# Patient Record
Sex: FEMALE | Race: WHITE | NOT HISPANIC OR LATINO | Employment: FULL TIME | URBAN - METROPOLITAN AREA
[De-identification: names, ages, dates, MRNs, and addresses within clinical notes are randomized per-mention and may not be internally consistent; named-entity substitution may affect disease eponyms.]

---

## 2022-08-20 ENCOUNTER — OFFICE VISIT (OUTPATIENT)
Dept: URGENT CARE | Facility: CLINIC | Age: 42
End: 2022-08-20
Payer: COMMERCIAL

## 2022-08-20 VITALS
SYSTOLIC BLOOD PRESSURE: 122 MMHG | TEMPERATURE: 97.9 F | DIASTOLIC BLOOD PRESSURE: 79 MMHG | WEIGHT: 219 LBS | BODY MASS INDEX: 35.2 KG/M2 | HEART RATE: 98 BPM | OXYGEN SATURATION: 98 % | HEIGHT: 66 IN

## 2022-08-20 DIAGNOSIS — L03.317 CELLULITIS, GLUTEAL: Primary | ICD-10-CM

## 2022-08-20 PROCEDURE — G0382 LEV 3 HOSP TYPE B ED VISIT: HCPCS | Performed by: PHYSICIAN ASSISTANT

## 2022-08-20 RX ORDER — INSULIN LISPRO 100 [IU]/ML
30 INJECTION, SOLUTION INTRAVENOUS; SUBCUTANEOUS
COMMUNITY

## 2022-08-20 RX ORDER — INSULIN DEGLUDEC INJECTION 100 U/ML
60 INJECTION, SOLUTION SUBCUTANEOUS
COMMUNITY

## 2022-08-20 RX ORDER — NORGESTIMATE AND ETHINYL ESTRADIOL 0.25-0.035
1 KIT ORAL DAILY
COMMUNITY

## 2022-08-20 RX ORDER — LISINOPRIL 40 MG/1
40 TABLET ORAL DAILY
COMMUNITY

## 2022-08-20 RX ORDER — SUMATRIPTAN 50 MG/1
50 TABLET, FILM COATED ORAL ONCE AS NEEDED
COMMUNITY

## 2022-08-20 RX ORDER — TIRZEPATIDE 2.5 MG/.5ML
INJECTION, SOLUTION SUBCUTANEOUS WEEKLY
COMMUNITY

## 2022-08-20 RX ORDER — CEPHALEXIN 500 MG/1
500 CAPSULE ORAL EVERY 12 HOURS SCHEDULED
Qty: 20 CAPSULE | Refills: 0 | Status: SHIPPED | OUTPATIENT
Start: 2022-08-20 | End: 2022-08-30

## 2022-08-20 RX ORDER — OMEPRAZOLE 20 MG/1
20 CAPSULE, DELAYED RELEASE ORAL DAILY
COMMUNITY

## 2022-08-20 RX ORDER — ATORVASTATIN CALCIUM 20 MG/1
20 TABLET, FILM COATED ORAL DAILY
COMMUNITY

## 2022-08-20 RX ORDER — FAMOTIDINE 40 MG/1
40 TABLET, FILM COATED ORAL DAILY
COMMUNITY

## 2022-08-20 NOTE — PROGRESS NOTES
History I for on care no no oral  [facility]        NAME: Mansi Becerra is a 46 y o  female  : 1980    MRN: [id number]  DATE: 2022  TIME: 9:00 AM    Assessment and Plan   Cellulitis, gluteal [T28 [id number]]  1  Cellulitis, gluteal  cephalexin (KEFLEX) 500 mg capsule         Patient Instructions   Take antibiotic as prescribed  Complete full dose of antibiotics even if symptoms begin to improve or resolve  May use OTC Tylenol for fever  Observe for signs of worsening infection including increased swelling, redness, pain, discharge, fever or chills, or persistent symptoms    Follow up with PCP in 3-5 days  Proceed to  ER if symptoms worsen  Chief Complaint     Chief Complaint   Patient presents with    Back Pain     Pt c/o ongoing cysts on lower back, increasing pain, concerned either one has burst or a new one has formed? Pain 3/10         History of Present Illness       Patient is a 30-year-old female with significant past medical history of type 1 diabetes presents the office complaining of pain to her lower back for few days  Patient has history of pilonidal cyst in this area but states they have not bothered her for many years  States she was using her loofa and actually scratched her lower back with him fingernail  Reports area has become more painful than usual but denies fevers, chills, or drainage  Review of Systems   Review of Systems   Constitutional: Negative for chills and fever  Gastrointestinal: Negative for nausea and vomiting           Current Medications       Current Outpatient Medications:     atorvastatin (LIPITOR) 20 mg tablet, Take 20 mg by mouth daily, Disp: , Rfl:     cephalexin (KEFLEX) 500 mg capsule, Take 1 capsule (500 mg total) by mouth every 12 (twelve) hours for 10 days, Disp: 20 capsule, Rfl: 0    famotidine (PEPCID) 40 MG tablet, Take 40 mg by mouth daily, Disp: , Rfl:     insulin degludec Tahsia Melchor FlexTouch) 100 units/mL injection pen, Inject 60 Units under the skin, Disp: , Rfl:     insulin lispro (HumaLOG) 100 units/mL injection, Inject 30 Units under the skin 3 (three) times a day with meals, Disp: , Rfl:     lisinopril (ZESTRIL) 40 mg tablet, Take 40 mg by mouth daily, Disp: , Rfl:     metFORMIN (GLUCOPHAGE) 1000 MG tablet, Take 1,000 mg by mouth 2 (two) times a day with meals, Disp: , Rfl:     norgestimate-ethinyl estradiol (ORTHO-CYCLEN) 0 25-35 MG-MCG per tablet, Take 1 tablet by mouth daily, Disp: , Rfl:     omeprazole (PriLOSEC) 20 mg delayed release capsule, Take 20 mg by mouth daily, Disp: , Rfl:     SUMAtriptan (IMITREX) 50 mg tablet, Take 50 mg by mouth once as needed for migraine, Disp: , Rfl:     Tirzepatide (Mounjaro) 2 5 MG/0 5ML SOPN, Inject under the skin, Disp: , Rfl:     Current Allergies     Allergies as of 08/20/2022 - Reviewed 08/20/2022   Allergen Reaction Noted    Azithromycin Hives 08/20/2022    Bactrim [sulfamethoxazole-trimethoprim] Hives 08/20/2022    Doxycycline Swelling 08/20/2022            The following portions of the patient's history were reviewed and updated as appropriate: allergies, current medications, past family history, past medical history, past social history, past surgical history and problem list      Past Medical History:   Diagnosis Date    Diabetes mellitus ([address] )     GERD (gastroesophageal reflux disease)        Past Surgical History:   Procedure Laterality Date    THROAT SURGERY  2020    TUBAL LIGATION  2016       History reviewed  No pertinent family history  Medications have been verified  Objective   /79   Pulse 98   Temp 97 9 °F (36 6 °C)   Ht 5' 6" (1 676 m)   Wt 99 3 kg (219 lb)   SpO2 98%   BMI 35 35 kg/m²   No LMP recorded  (Menstrual status: Birth Control)  Physical Exam     Physical Exam  Vitals and nursing note reviewed  Constitutional:       Appearance: She is well-developed  HENT:      Head: Normocephalic and atraumatic        Right Ear: External ear normal       Left Ear: External ear normal       Nose: Nose normal    Eyes:      General: Lids are normal       Conjunctiva/sclera: Conjunctivae normal    Cardiovascular:      Rate and Rhythm: Normal rate and regular rhythm  Pulmonary:      Effort: Pulmonary effort is normal       Breath sounds: Normal breath sounds  Skin:     General: Skin is warm and dry  Capillary Refill: Capillary refill takes less than 2 seconds  Neurological:      Mental Status: She is alert

## 2022-08-23 ENCOUNTER — OFFICE VISIT (OUTPATIENT)
Dept: URGENT CARE | Facility: CLINIC | Age: 42
End: 2022-08-23
Payer: COMMERCIAL

## 2022-08-23 VITALS
WEIGHT: 219 LBS | SYSTOLIC BLOOD PRESSURE: 132 MMHG | HEIGHT: 66 IN | DIASTOLIC BLOOD PRESSURE: 80 MMHG | RESPIRATION RATE: 16 BRPM | TEMPERATURE: 98.9 F | BODY MASS INDEX: 35.2 KG/M2 | HEART RATE: 102 BPM | OXYGEN SATURATION: 100 %

## 2022-08-23 DIAGNOSIS — L03.317 CELLULITIS OF BUTTOCK: Primary | ICD-10-CM

## 2022-08-23 PROCEDURE — G0382 LEV 3 HOSP TYPE B ED VISIT: HCPCS | Performed by: PHYSICIAN ASSISTANT

## 2022-08-23 RX ORDER — CLINDAMYCIN HYDROCHLORIDE 300 MG/1
300 CAPSULE ORAL 3 TIMES DAILY
Qty: 30 CAPSULE | Refills: 0 | Status: SHIPPED | OUTPATIENT
Start: 2022-08-23 | End: 2022-09-02

## 2022-08-23 NOTE — PROGRESS NOTES
[facility]        NAME: Saint Brewer is a 46 y o  female  : 1980    MRN: [id number]  DATE: 2022  TIME: 6:20 PM    Assessment and Plan   Cellulitis of buttock [L03 [id number]]  1  Cellulitis of buttock  clindamycin (CLEOCIN) 300 MG capsule         Patient Instructions   Discontinue Keflex  Begin clindamycin antibiotic  Sitz baths  Tylenol and ibuprofen    Follow up with PCP in 3-5 days  Proceed to  ER if symptoms worsen  Chief Complaint     Chief Complaint   Patient presents with    Pilonidal Cyst     Hx of pilonidal cyst 10 yrs ago then went dormant  Now it seems to have gotten filled again         History of Present Illness          Patient is a 28-year-old female with significant past medical history of type 1 diabetes presents the office complaining of pain to her lower back 1 week  Patient has history of pilonidal cyst in this area but states they have not bothered her for many years  States she was using her loofa and actually scratched her lower back with him fingernail  She was seen in the office 3 days ago and prescribed Keflex for cellulitis  States she has been taking as prescribed but the area has become more red and painful  Was unable to see her PCP due to lack of appointments  Denies fevers, chills, nausea, or vomiting      Review of Systems   Review of Systems   Constitutional: Negative for chills and fever  Skin: Positive for wound           Current Medications       Current Outpatient Medications:     atorvastatin (LIPITOR) 20 mg tablet, Take 20 mg by mouth daily, Disp: , Rfl:     cephalexin (KEFLEX) 500 mg capsule, Take 1 capsule (500 mg total) by mouth every 12 (twelve) hours for 10 days, Disp: 20 capsule, Rfl: 0    clindamycin (CLEOCIN) 300 MG capsule, Take 1 capsule (300 mg total) by mouth 3 (three) times a day for 10 days, Disp: 30 capsule, Rfl: 0    famotidine (PEPCID) 40 MG tablet, Take 40 mg by mouth daily, Disp: , Rfl:     insulin degludec Konrad Gitelman FlexTouch) 100 units/mL injection pen, Inject 60 Units under the skin, Disp: , Rfl:     insulin lispro (HumaLOG) 100 units/mL injection, Inject 30 Units under the skin 3 (three) times a day with meals, Disp: , Rfl:     lisinopril (ZESTRIL) 40 mg tablet, Take 40 mg by mouth daily, Disp: , Rfl:     metFORMIN (GLUCOPHAGE) 1000 MG tablet, Take 1,000 mg by mouth 2 (two) times a day with meals, Disp: , Rfl:     norgestimate-ethinyl estradiol (ORTHO-CYCLEN) 0 25-35 MG-MCG per tablet, Take 1 tablet by mouth daily, Disp: , Rfl:     omeprazole (PriLOSEC) 20 mg delayed release capsule, Take 20 mg by mouth daily, Disp: , Rfl:     SUMAtriptan (IMITREX) 50 mg tablet, Take 50 mg by mouth once as needed for migraine, Disp: , Rfl:     Tirzepatide (Mounjaro) 2 5 MG/0 5ML SOPN, Inject under the skin once a week, Disp: , Rfl:     Current Allergies     Allergies as of 08/23/2022 - Reviewed 08/23/2022   Allergen Reaction Noted    Azithromycin Hives 08/20/2022    Bactrim [sulfamethoxazole-trimethoprim] Hives 08/20/2022    Doxycycline Swelling 08/20/2022            The following portions of the patient's history were reviewed and updated as appropriate: allergies, current medications, past family history, past medical history, past social history, past surgical history and problem list      Past Medical History:   Diagnosis Date    Diabetes mellitus [address] [address] )     GERD (gastroesophageal reflux disease)        Past Surgical History:   Procedure Laterality Date    THROAT SURGERY  2020    TUBAL LIGATION  2016       Family History   Problem Relation Age of Onset    Diabetes Mother          Medications have been verified  Objective   /80   Pulse 102   Temp 98 9 °F (37 2 °C)   Resp 16   Ht 5' 6" (1 676 m)   Wt 99 3 kg (219 lb)   SpO2 100%   BMI 35 35 kg/m²   No LMP recorded  (Menstrual status: Birth Control)  Physical Exam     Physical Exam  Vitals and nursing note reviewed     Constitutional: Appearance: She is well-developed  HENT:      Head: Normocephalic and atraumatic  Right Ear: External ear normal       Left Ear: External ear normal       Nose: Nose normal    Eyes:      General: Lids are normal       Conjunctiva/sclera: Conjunctivae normal    Skin:     General: Skin is warm and dry  Capillary Refill: Capillary refill takes less than 2 seconds  Comments: Approximately 4 cm area of erythema, swelling, warmth, and hard induration to the gluteal cleft  No fluctuation or palpable drainable abscess  TTP  Neurological:      Mental Status: She is alert

## 2022-08-23 NOTE — PATIENT INSTRUCTIONS
Discontinue Keflex  Begin clindamycin antibiotic  Sitz baths  Tylenol and ibuprofen  Follow-up with family doctor in 3-5 days  Go to ER if symptoms become severe

## 2024-07-21 ENCOUNTER — OFFICE VISIT (OUTPATIENT)
Dept: URGENT CARE | Facility: CLINIC | Age: 44
End: 2024-07-21
Payer: COMMERCIAL

## 2024-07-21 ENCOUNTER — APPOINTMENT (OUTPATIENT)
Dept: RADIOLOGY | Facility: CLINIC | Age: 44
End: 2024-07-21
Payer: COMMERCIAL

## 2024-07-21 VITALS
SYSTOLIC BLOOD PRESSURE: 161 MMHG | WEIGHT: 200 LBS | TEMPERATURE: 98.3 F | DIASTOLIC BLOOD PRESSURE: 91 MMHG | BODY MASS INDEX: 32.14 KG/M2 | HEIGHT: 66 IN | RESPIRATION RATE: 18 BRPM | HEART RATE: 118 BPM | OXYGEN SATURATION: 100 %

## 2024-07-21 DIAGNOSIS — S92.901A CLOSED FRACTURE OF RIGHT FOOT, INITIAL ENCOUNTER: Primary | ICD-10-CM

## 2024-07-21 DIAGNOSIS — M79.671 RIGHT FOOT PAIN: ICD-10-CM

## 2024-07-21 PROCEDURE — 73630 X-RAY EXAM OF FOOT: CPT

## 2024-07-21 PROCEDURE — G0382 LEV 3 HOSP TYPE B ED VISIT: HCPCS | Performed by: PHYSICIAN ASSISTANT

## 2024-07-21 NOTE — PROGRESS NOTES
[facility]        NAME: Mansi Palm is a 43 y.o. female  : 1980    MRN: [id number]  DATE: 2024  TIME: 8:07 AM    Assessment and Plan   Closed fracture of right foot, initial encounter [S92.901A]  1. Closed fracture of right foot, initial encounter  XR foot 3+ vw right    Ambulatory Referral to Orthopedic Surgery    Orthopedic injury treatment            Patient Instructions   Cam boots.  Elevate.  Tylenol ibuprofen.  Orthopedics.    Follow up with PCP in 3-5 days.  Proceed to  ER if symptoms worsen.    If tests have been performed at [facility], our office will contact you with results if changes need to be made to the care plan discussed with you at the visit.  You can review your full results on [facility].    Chief Complaint     Chief Complaint   Patient presents with    Foot Pain     Right foot pain from table falling on on           History of Present Illness       Patient is a 43-year-old female significant past medical history of hypertension and diabetes presents also planing of right foot pain for 4 weeks.  States she dropped a heavy table on it.  Thought it was just bruised so she has been taking care of it at home.  Pain is rated 6 out of 10.  Symptoms were improving but then got worse after she was doing a bunch of gardening.  She has an area of skin discoloration and ecchymosis that began same day or day after injury which has not improved or changed at all.        Review of Systems   Review of Systems   Musculoskeletal:  Positive for arthralgias.   Skin:  Positive for color change.         Current Medications       Current Outpatient Medications:     atorvastatin (LIPITOR) 20 mg tablet, Take 20 mg by mouth daily, Disp: , Rfl:     famotidine (PEPCID) 40 MG tablet, Take 40 mg by mouth daily, Disp: , Rfl:     insulin degludec (Tresiba FlexTouch) 100 units/mL injection pen, Inject 60 Units under the skin, Disp: , Rfl:     insulin lispro (HumaLOG) 100 units/mL  "injection, Inject 30 Units under the skin 3 (three) times a day with meals, Disp: , Rfl:     lisinopril (ZESTRIL) 40 mg tablet, Take 40 mg by mouth daily, Disp: , Rfl:     metFORMIN (GLUCOPHAGE) 1000 MG tablet, Take 1,000 mg by mouth 2 (two) times a day with meals, Disp: , Rfl:     norgestimate-ethinyl estradiol (ORTHO-CYCLEN) 0.25-35 MG-MCG per tablet, Take 1 tablet by mouth daily, Disp: , Rfl:     omeprazole (PriLOSEC) 20 mg delayed release capsule, Take 20 mg by mouth daily, Disp: , Rfl:     SUMAtriptan (IMITREX) 50 mg tablet, Take 50 mg by mouth once as needed for migraine, Disp: , Rfl:     Tirzepatide (Mounjaro) 2.5 MG/0.5ML SOPN, Inject under the skin once a week, Disp: , Rfl:     Current Allergies     Allergies as of 07/21/2024 - Reviewed 07/21/2024   Allergen Reaction Noted    Azithromycin Hives 08/20/2022    Bactrim [sulfamethoxazole-trimethoprim] Hives 08/20/2022    Doxycycline Swelling 08/20/2022            The following portions of the patient's history were reviewed and updated as appropriate: allergies, current medications, past family history, past medical history, past social history, past surgical history and problem list.     Past Medical History:   Diagnosis Date    Diabetes mellitus (HCC)     GERD (gastroesophageal reflux disease)     Hypertension        Past Surgical History:   Procedure Laterality Date    THROAT SURGERY  2020    TUBAL LIGATION  2016       Family History   Problem Relation Age of Onset    Diabetes Mother          Medications have been verified.        Objective   /91   Pulse (!) 118   Temp 98.3 °F (36.8 °C) (Temporal)   Resp 18   Ht 5' 6\" (1.676 m)   Wt 90.7 kg (200 lb)   SpO2 100%   BMI 32.28 kg/m²   No LMP recorded.       Physical Exam     Physical Exam  Vitals and nursing note reviewed.   Constitutional:       Appearance: She is well-developed.   HENT:      Head: Normocephalic and atraumatic.      Right Ear: External ear normal.      Left Ear: External ear " normal.      Nose: Nose normal.   Eyes:      General: Lids are normal.      Conjunctiva/sclera: Conjunctivae normal.   Musculoskeletal:      Right foot: Normal range of motion. Bony tenderness (distal 1MT with skin redness. see photo) present.   Skin:     General: Skin is warm and dry.      Capillary Refill: Capillary refill takes less than 2 seconds.   Neurological:      Mental Status: She is alert.         Right foot x-ray: Healing fracture to distal aspect of first metatarsal.              Right foot          Orthopedic injury treatment    Date/Time: 7/21/2024 10:00 AM    Performed by: Daniel Humphrey PA-C  Authorized by: Daniel Humphrey PA-C    Patient Location:  Bedside  [place] Protocol:  Consent: Verbal consent obtained.  Risks and benefits: risks, benefits and alternatives were discussed  Consent given by: patient  Patient understanding: patient states understanding of the procedure being performed  Patient consent: the patient's understanding of the procedure matches consent given  Patient identity confirmed: verbally with patient    Injury location:  Foot  Location details:  Right foot  Injury type:  Fracture  Fracture type: first metatarsal    Neurovascular status: Neurovascularly intact    Distal perfusion: normal    Neurological function: normal    Range of motion: normal    Immobilization: [place].  Neurovascular status: Neurovascularly intact    Distal perfusion: normal    Neurological function: normal    Range of motion: unchanged    Patient tolerance:  Patient tolerated the procedure well with no immediate complications

## 2025-02-26 ENCOUNTER — OFFICE VISIT (OUTPATIENT)
Dept: URGENT CARE | Facility: CLINIC | Age: 45
End: 2025-02-26
Payer: COMMERCIAL

## 2025-02-26 VITALS
HEART RATE: 119 BPM | DIASTOLIC BLOOD PRESSURE: 71 MMHG | SYSTOLIC BLOOD PRESSURE: 120 MMHG | WEIGHT: 209.8 LBS | RESPIRATION RATE: 20 BRPM | BODY MASS INDEX: 33.72 KG/M2 | TEMPERATURE: 97.6 F | HEIGHT: 66 IN | OXYGEN SATURATION: 97 %

## 2025-02-26 DIAGNOSIS — B34.9 VIRAL SYNDROME: Primary | ICD-10-CM

## 2025-02-26 PROCEDURE — G0382 LEV 3 HOSP TYPE B ED VISIT: HCPCS | Performed by: PHYSICIAN ASSISTANT

## 2025-02-26 PROCEDURE — 87636 SARSCOV2 & INF A&B AMP PRB: CPT | Performed by: PHYSICIAN ASSISTANT

## 2025-02-26 RX ORDER — ONDANSETRON 4 MG/1
TABLET, ORALLY DISINTEGRATING ORAL
COMMUNITY
Start: 2025-01-14

## 2025-02-26 RX ORDER — INSULIN ASPART 100 [IU]/ML
INJECTION, SOLUTION INTRAVENOUS; SUBCUTANEOUS
COMMUNITY
Start: 2025-01-31

## 2025-02-26 RX ORDER — METFORMIN HYDROCHLORIDE 500 MG/1
1000 TABLET, EXTENDED RELEASE ORAL 2 TIMES DAILY WITH MEALS
COMMUNITY
Start: 2024-12-29

## 2025-02-26 RX ORDER — LEVOCETIRIZINE DIHYDROCHLORIDE 5 MG/1
5 TABLET, FILM COATED ORAL
COMMUNITY
Start: 2024-10-24

## 2025-02-26 RX ORDER — BENZONATATE 100 MG/1
100 CAPSULE ORAL 3 TIMES DAILY PRN
Qty: 20 CAPSULE | Refills: 0 | Status: SHIPPED | OUTPATIENT
Start: 2025-02-26

## 2025-02-26 RX ORDER — INSULIN DEGLUDEC 200 U/ML
INJECTION, SOLUTION SUBCUTANEOUS
COMMUNITY
Start: 2025-01-08

## 2025-02-26 RX ORDER — GLUCAGON INJECTION, SOLUTION 1 MG/.2ML
1 INJECTION, SOLUTION SUBCUTANEOUS
COMMUNITY
Start: 2024-12-03

## 2025-02-26 RX ORDER — OSELTAMIVIR PHOSPHATE 75 MG/1
75 CAPSULE ORAL EVERY 12 HOURS SCHEDULED
Qty: 10 CAPSULE | Refills: 0 | Status: SHIPPED | OUTPATIENT
Start: 2025-02-26 | End: 2025-03-03

## 2025-02-26 RX ORDER — TIRZEPATIDE 7.5 MG/.5ML
INJECTION, SOLUTION SUBCUTANEOUS
COMMUNITY
Start: 2025-02-24

## 2025-02-26 NOTE — PROGRESS NOTES
"[facility]        NAME: Mansi Palm is a 44 y.o. female  : 1980    MRN: [id number]  DATE: 2025  TIME: 8:46 AM    /71   Pulse (!) 119   Temp 97.6 °F (36.4 °C)   Resp 20   Ht 5' 6\" (1.676 m)   Wt 95.2 kg (209 lb 12.8 oz)   LMP 2025   SpO2 97%   BMI 33.86 kg/m²     Assessment and Plan   Viral syndrome [B34.9]  1. Viral syndrome  benzonatate (TESSALON PERLES) 100 mg capsule    oseltamivir (TAMIFLU) 75 mg capsule            Patient Instructions       Follow up with PCP in 3-5 days.  Proceed to  ER if symptoms worsen.    Chief Complaint     Chief Complaint   Patient presents with    Cold Like Symptoms     Sinus congestion, coughing, sore throat, torso pain d/t coughing, and fevers, starting . Chills, runny nose, vomiting, ear pain, and headaches starting Monday. Ear pressure and dizziness starting yesterday.          History of Present Illness       Pt with 3 days coughing fever body aches nasal congestion, no flu vaccine this year, home covid test negative this morning         Review of Systems   Review of Systems   Constitutional:  Positive for fatigue and fever.   HENT:  Positive for congestion and rhinorrhea.    Eyes: Negative.    Respiratory:  Positive for cough.    Cardiovascular: Negative.    Gastrointestinal: Negative.    Endocrine: Negative.    Genitourinary: Negative.    Musculoskeletal:  Positive for myalgias.   Skin: Negative.    Allergic/Immunologic: Negative.    Neurological: Negative.    Hematological: Negative.    Psychiatric/Behavioral: Negative.     All other systems reviewed and are negative.        Current Medications       Current Outpatient Medications:     Acetaminophen (TYLENOL PO), Take by mouth, Disp: , Rfl:     atorvastatin (LIPITOR) 20 mg tablet, Take 20 mg by mouth daily, Disp: , Rfl:     benzonatate (TESSALON PERLES) 100 mg capsule, Take 1 capsule (100 mg total) by mouth 3 (three) times a day as needed for cough, Disp: 20 capsule, " Rfl: 0    famotidine (PEPCID) 40 MG tablet, Take 40 mg by mouth daily, Disp: , Rfl:     Gvoke HypoPen 2-Pack 1 MG/0.2ML SOAJ, Inject 1 mg under the skin, Disp: , Rfl:     Ibuprofen (MOTRIN PO), Take by mouth, Disp: , Rfl:     insulin lispro (HumaLOG) 100 units/mL injection, Inject 30 Units under the skin 3 (three) times a day with meals, Disp: , Rfl:     levocetirizine (XYZAL) 5 MG tablet, Take 5 mg by mouth, Disp: , Rfl:     lisinopril (ZESTRIL) 40 mg tablet, Take 40 mg by mouth daily, Disp: , Rfl:     metFORMIN (GLUCOPHAGE) 1000 MG tablet, Take 1,000 mg by mouth 2 (two) times a day with meals (Patient not taking: Reported on 2/26/2025), Disp: , Rfl:     metFORMIN (GLUCOPHAGE-XR) 500 mg 24 hr tablet, 1,000 mg 2 (two) times a day with meals, Disp: , Rfl:     Mounjaro 7.5 MG/0.5ML SOAJ, , Disp: , Rfl:     norgestimate-ethinyl estradiol (ORTHO-CYCLEN) 0.25-35 MG-MCG per tablet, Take 1 tablet by mouth daily, Disp: , Rfl:     NovoLOG FlexPen 100 units/mL injection pen, USE PER SLIDING SCALE. TOTAL DAILY DOSE 100 UNITS, Disp: , Rfl:     omeprazole (PriLOSEC) 20 mg delayed release capsule, Take 20 mg by mouth daily, Disp: , Rfl:     ondansetron (ZOFRAN-ODT) 4 mg disintegrating tablet, DISSOLVE 1 TABLET (4 MG TOTAL) IN CHEEK EVERY 8 (EIGHT) HOURS AS NEEDED FOR NAUSEA OR VOMITING., Disp: , Rfl:     oseltamivir (TAMIFLU) 75 mg capsule, Take 1 capsule (75 mg total) by mouth every 12 (twelve) hours for 5 days, Disp: 10 capsule, Rfl: 0    Pseudoeph-Doxylamine-DM-APAP (NYQUIL PO), Take by mouth, Disp: , Rfl:     SUMAtriptan (IMITREX) 50 mg tablet, Take 50 mg by mouth once as needed for migraine, Disp: , Rfl:     Tresiba FlexTouch 200 units/mL CONCENTRATED U-200 injection pen, INJECT UP TO 56 UNITS SUBCUTANEOUSLY DAILY., Disp: , Rfl:     Current Allergies     Allergies as of 02/26/2025 - Reviewed 02/26/2025   Allergen Reaction Noted    Strawberry extract - food allergy Shortness Of Breath 03/18/2013    Azithromycin Hives  "08/20/2022    Bactrim [sulfamethoxazole-trimethoprim] Hives 08/20/2022    Doxycycline Swelling 08/20/2022    Insulin glargine Swelling 12/16/2015    Peanut oil - food allergy Hives 12/08/2022            The following portions of the patient's history were reviewed and updated as appropriate: allergies, current medications, past family history, past medical history, past social history, past surgical history and problem list.     Past Medical History:   Diagnosis Date    Allergic     Diabetes mellitus (HCC)     GERD (gastroesophageal reflux disease)     Hypertension     Migraine        Past Surgical History:   Procedure Laterality Date    THROAT SURGERY  2020    TUBAL LIGATION  2016       Family History   Problem Relation Age of Onset    Diabetes Mother          Medications have been verified.        Objective   /71   Pulse (!) 119   Temp 97.6 °F (36.4 °C)   Resp 20   Ht 5' 6\" (1.676 m)   Wt 95.2 kg (209 lb 12.8 oz)   LMP 01/30/2025   SpO2 97%   BMI 33.86 kg/m²        Physical Exam     Physical Exam  Vitals and nursing note reviewed.   Constitutional:       Appearance: Normal appearance. She is normal weight.   HENT:      Head: Normocephalic and atraumatic.      Right Ear: Tympanic membrane, ear canal and external ear normal.      Left Ear: Tympanic membrane, ear canal and external ear normal.      Nose: Rhinorrhea present.      Comments: Clear d/c      Mouth/Throat:      Mouth: Mucous membranes are moist.      Pharynx: Oropharynx is clear.   Eyes:      Conjunctiva/sclera: Conjunctivae normal.      Pupils: Pupils are equal, round, and reactive to light.   Cardiovascular:      Rate and Rhythm: Normal rate and regular rhythm.      Pulses: Normal pulses.      Heart sounds: Normal heart sounds.   Pulmonary:      Effort: Pulmonary effort is normal.      Breath sounds: Normal breath sounds.   Abdominal:      General: Bowel sounds are normal.      Palpations: Abdomen is soft.   Musculoskeletal:         " General: Normal range of motion.      Cervical back: Normal range of motion and neck supple.   Skin:     General: Skin is warm.      Capillary Refill: Capillary refill takes less than 2 seconds.   Neurological:      General: No focal deficit present.      Mental Status: She is alert and oriented to person, place, and time.   Psychiatric:         Behavior: Behavior normal.

## 2025-02-27 ENCOUNTER — RESULTS FOLLOW-UP (OUTPATIENT)
Dept: URGENT CARE | Facility: CLINIC | Age: 45
End: 2025-02-27

## 2025-02-27 LAB
FLUAV RNA RESP QL NAA+PROBE: POSITIVE
FLUBV RNA RESP QL NAA+PROBE: NEGATIVE
SARS-COV-2 RNA RESP QL NAA+PROBE: NEGATIVE